# Patient Record
Sex: FEMALE | Race: ASIAN | NOT HISPANIC OR LATINO | Employment: UNEMPLOYED | URBAN - NONMETROPOLITAN AREA
[De-identification: names, ages, dates, MRNs, and addresses within clinical notes are randomized per-mention and may not be internally consistent; named-entity substitution may affect disease eponyms.]

---

## 2017-06-26 ENCOUNTER — OFFICE VISIT (OUTPATIENT)
Dept: INTERNAL MEDICINE | Facility: CLINIC | Age: 32
End: 2017-06-26

## 2017-06-26 VITALS
SYSTOLIC BLOOD PRESSURE: 104 MMHG | OXYGEN SATURATION: 99 % | DIASTOLIC BLOOD PRESSURE: 58 MMHG | TEMPERATURE: 97.9 F | HEART RATE: 74 BPM | WEIGHT: 115.38 LBS

## 2017-06-26 DIAGNOSIS — J30.1 SEASONAL ALLERGIC RHINITIS DUE TO POLLEN: Primary | ICD-10-CM

## 2017-06-26 DIAGNOSIS — Z87.892 HISTORY OF ANAPHYLAXIS: ICD-10-CM

## 2017-06-26 PROCEDURE — 99213 OFFICE O/P EST LOW 20 MIN: CPT | Performed by: INTERNAL MEDICINE

## 2017-06-26 RX ORDER — EPINEPHRINE 0.3 MG/.3ML
INJECTION SUBCUTANEOUS
COMMUNITY

## 2017-06-26 RX ORDER — LORATADINE 10 MG/1
10 TABLET ORAL DAILY
COMMUNITY
End: 2017-07-18 | Stop reason: SDUPTHER

## 2017-06-26 NOTE — PROGRESS NOTES
Chief Complaint   Patient presents with   • Establish Care     with Dr. Vermeesch today.      Subjective   Mary Kay Bullock is a 32 y.o. female.     HPI Comments: Patient is here to be established today.  Past medical history of allergies and anaphylactic shock.  Patient brings with her a note from her physician and dermatologist.  It states that patient has history of anaphylactic shock and has been hospitalized 3 times for this.  She carries with her and epinephrine autoinjector at all times and takes Claritin daily, sodium cromoglicate 1.5 mg three times daily.  She also was given procarterol inhaler to use prn when her allergies are flaring.  No past surgical history.  Medications include Claritin 10 mg daily and epinephrine when necessary  No known drug allergies.  Social history, family history reviewed.  HCM:  Mammogram completed in December 2016.   She has been feeling well.   Denies any current allergy sxs.  No wheezing or SOA.   She is currently pregnancy.  She is due in August.  Current meds are safe during pregnancy per her [place] doctor.   She has a good appetite.  She is sleeping well.          The following portions of the patient's history were reviewed and updated as appropriate: allergies, current medications, past family history, past medical history, past social history, past surgical history and problem list.    Review of Systems   HENT: Negative.    Respiratory: Negative for chest tightness, shortness of breath and wheezing.    Cardiovascular: Negative for palpitations and leg swelling.   Allergic/Immunologic: Positive for environmental allergies.       Objective   /58  Pulse 74  Temp 97.9 °F (36.6 °C)  Wt 115 lb 6 oz (52.3 kg)  SpO2 99%  There is no height or weight on file to calculate BMI.  Physical Exam   Constitutional: She is oriented to person, place, and time. She appears well-developed and well-nourished.   HENT:   Head: Normocephalic and atraumatic.   Right Ear: External ear  normal.   Left Ear: External ear normal.   Clear PND with mild erythema    Eyes: Conjunctivae and EOM are normal. Pupils are equal, round, and reactive to light.   Neck: Normal range of motion. Neck supple. No thyromegaly present.   Cardiovascular: Normal rate, regular rhythm, normal heart sounds and intact distal pulses.    No murmur heard.  Pulmonary/Chest: Effort normal and breath sounds normal. No respiratory distress.   Abdominal: Soft. Bowel sounds are normal.   Pt is 7 mo pregnant   Musculoskeletal: She exhibits no edema.   Lymphadenopathy:     She has no cervical adenopathy.   Neurological: She is alert and oriented to person, place, and time. No cranial nerve deficit.   Psychiatric: She has a normal mood and affect. Judgment normal.   Nursing note and vitals reviewed.      Assessment/Plan   Mary Kay Bullock is here today and the following problems have been addressed:      Mary Kay was seen today for establish care.    Diagnoses and all orders for this visit:    Seasonal allergic rhinitis due to pollen    History of anaphylaxis    Continue claritin daily and prn epi pen prn  Will try to find comparable medications to sodium cromoglicate and procarterol inhaler, then call these meds in for pt  Will contact her doctor in [place] to see if singulair and flovent inhaler are acceptable alternatives to above meds  She is currently pregnant and seeing OB/GYN  Her epi pen is due for RF next March    RTC 6 mo or prn      Please note that portions of this note were completed with a voice recognition program.  Efforts were made to edit dictation, but occasionally words are mistranscribed.

## 2017-07-05 ENCOUNTER — TELEPHONE (OUTPATIENT)
Dept: INTERNAL MEDICINE | Facility: CLINIC | Age: 32
End: 2017-07-05

## 2017-07-05 RX ORDER — ALBUTEROL SULFATE 90 UG/1
2 AEROSOL, METERED RESPIRATORY (INHALATION) EVERY 6 HOURS PRN
Qty: 1 INHALER | Refills: 1 | Status: SHIPPED | OUTPATIENT
Start: 2017-07-05 | End: 2021-03-04

## 2017-07-05 NOTE — TELEPHONE ENCOUNTER
I emailed Pt's previous doctor in regards to medications.     This is the response I received.       Hello?Dr. Vermeesch.  and  Ms, Justo Chau    Thank you for your email. I would not recommend the use of flovent  (Steroid?) for her stable condition. However, it is dependent on your decision and her status.  Best Regards.      Tianna Santos MD.

## 2017-07-05 NOTE — TELEPHONE ENCOUNTER
I recommend that patient take Claritin 10 mg daily.  I would like you to call her in an albuterol inhaler to replace her current procarterol inhaler.  I am not going to replace the other oral medication that she gets from [place], as we have nothing th  at is comparable to it.  Albuterol inhaler instructions are 2 puffs every 6 hours when necessary for wheezing as needed call in one inhaler with one refill.

## 2017-07-18 ENCOUNTER — TELEPHONE (OUTPATIENT)
Dept: INTERNAL MEDICINE | Facility: CLINIC | Age: 32
End: 2017-07-18

## 2017-07-18 RX ORDER — LORATADINE 10 MG/1
10 TABLET ORAL DAILY
Qty: 30 TABLET | Refills: 11 | Status: SHIPPED | OUTPATIENT
Start: 2017-07-18 | End: 2020-06-11 | Stop reason: SDUPTHER

## 2017-07-18 NOTE — TELEPHONE ENCOUNTER
----- Message from Marilyn K Vermeesch, MD sent at 7/18/2017 12:27 PM EDT -----  Regarding: RE: Referral Request  Contact: [phone]  Please call in Claritin 10 mg daily #30 with 11 refills.  Please let patient know if it was approved.    ----- Message -----     From: Yaquelin Chau MA     Sent: 7/18/2017   8:07 AM       To: Marilyn K Vermeesch, MD  Subject: FW: Referral Request                                 ----- Message -----     From: Mary Kay Bullock     Sent: 7/17/2017   9:06 PM       To: Fadumo Devi [facility]  Subject: Referral Request                                 Dr.Vermeesch    I asked you about my medication of Claritin, you told me to buy and get at over the counter.  However, if I buy it over the counter, my 's company will not bear the payment of the medicine. So I would like you to send the prescription to [facility] pharmacy. Could you do that?  I want you to let me know when I get the medicine.    Thank you,

## 2017-10-13 ENCOUNTER — FLU SHOT (OUTPATIENT)
Dept: INTERNAL MEDICINE | Facility: CLINIC | Age: 32
End: 2017-10-13

## 2017-10-13 PROCEDURE — 90686 IIV4 VACC NO PRSV 0.5 ML IM: CPT | Performed by: INTERNAL MEDICINE

## 2017-10-13 PROCEDURE — 90471 IMMUNIZATION ADMIN: CPT | Performed by: INTERNAL MEDICINE

## 2017-10-26 ENCOUNTER — OFFICE VISIT (OUTPATIENT)
Dept: INTERNAL MEDICINE | Facility: CLINIC | Age: 32
End: 2017-10-26

## 2017-10-26 VITALS
DIASTOLIC BLOOD PRESSURE: 62 MMHG | HEART RATE: 74 BPM | WEIGHT: 105 LBS | TEMPERATURE: 98.4 F | OXYGEN SATURATION: 96 % | SYSTOLIC BLOOD PRESSURE: 96 MMHG

## 2017-10-26 DIAGNOSIS — J06.9 VIRAL URI WITH COUGH: Primary | ICD-10-CM

## 2017-10-26 LAB
EXPIRATION DATE: NORMAL
INTERNAL CONTROL: NORMAL
Lab: NORMAL
S PYO AG THROAT QL: NEGATIVE

## 2017-10-26 PROCEDURE — 87880 STREP A ASSAY W/OPTIC: CPT | Performed by: PHYSICIAN ASSISTANT

## 2017-10-26 PROCEDURE — 99213 OFFICE O/P EST LOW 20 MIN: CPT | Performed by: PHYSICIAN ASSISTANT

## 2017-10-26 RX ORDER — ACETAMINOPHEN 500 MG
TABLET ORAL
Qty: 90 TABLET | Refills: 0 | Status: SHIPPED | OUTPATIENT
Start: 2017-10-26 | End: 2018-02-27 | Stop reason: SDUPTHER

## 2017-10-26 NOTE — PROGRESS NOTES
Mary Kay Bullock is a 32 y.o. female.     Subjective   History of Present Illness   Sore throat, cough, postnasal drip and rhinorrhea since yesterday. Denies fever, chills, headache, body aches or abdominal pain. Has not been using any OTC treatments for symtpoms. She is currently breastfeeding.       The following portions of the patient's history were reviewed and updated as appropriate: allergies, current medications, past family history, past medical history, past social history, past surgical history and problem list.    Review of Systems    Constitutional: Negative for appetite change, chills, fatigue, fever and unexpected weight change.   HENT: sore throat, congestion, rhinorrhea, postnasal drip. Negative for ear pain, hearing loss, nosebleeds, tinnitus and trouble swallowing.    Eyes: Negative for photophobia, discharge and visual disturbance.   Respiratory: cough. Negative for chest tightness, shortness of breath and wheezing.    Cardiovascular: Negative for chest pain, palpitations and leg swelling.   Gastrointestinal: Negative for abdominal distention, abdominal pain, blood in stool, constipation, diarrhea, nausea and vomiting.   Endocrine: Negative for cold intolerance, heat intolerance, polydipsia, polyphagia and polyuria.   Musculoskeletal: Negative for arthralgias, back pain, joint swelling, myalgias, neck pain and neck stiffness.   Skin: Negative for color change, pallor, rash and wound.   Allergic/Immunologic: Negative for environmental allergies, food allergies and immunocompromised state.   Neurological: Negative for dizziness, tremors, seizures, weakness, numbness and headaches.   Hematological: Negative for adenopathy. Does not bruise/bleed easily.   Psychiatric/Behavioral: Negative for sleep disturbances, agitation, behavioral problems, confusion, hallucinations, self-injury and suicidal ideas. The patient is not nervous/anxious.      Objective    Physical Exam  Constitutional: Oriented to person,  place, and time. Appears well-developed and well-nourished.   HENT: OP erythema without enlarged tonsils or exudate. Clear nasal congestion noted. TMs normal.   Head: No sinus tenderness. Normocephalic and atraumatic.   Eyes: EOM are normal. Pupils are equal, round, and reactive to light.   Neck: Normal range of motion. Neck supple.   Cardiovascular: Normal rate, regular rhythm and normal heart sounds.    Pulmonary/Chest: Effort normal and breath sounds normal. No respiratory distress.  Has no wheezes or rales. Exhibits no chest wall tenderness.   Abdominal: Soft. Bowel sounds are normal. Exhibits no distension and no mass. There is no tenderness.   Musculoskeletal: Normal range of motion. Exhibits no tenderness.   Neurological: Alert and oriented to person, place, and time.   Skin: Skin is warm and dry.   Psychiatric: Has a normal mood and affect. Behavior is normal. Judgment and thought content normal.       BP 96/62  Pulse 74  Temp 98.4 °F (36.9 °C)  Wt 105 lb (47.6 kg)  SpO2 96%    Nursing note and vitals reviewed.        Assessment/Plan   Mary Kay was seen today for cough, sore throat and sinus problem.    Diagnoses and all orders for this visit:    Viral URI with cough  -     POC Rapid Strep A- negative.     Discussed at home symptomatic care including and increase in warm fluid intake, Tylenol use and gargling with warm salt water.       Call or RTC if symptoms worsen or persist. Currently breastfeeding, take into consideration if antibiotics need to be prescribed at a later time.

## 2017-12-19 ENCOUNTER — OFFICE VISIT (OUTPATIENT)
Dept: INTERNAL MEDICINE | Facility: CLINIC | Age: 32
End: 2017-12-19

## 2017-12-19 VITALS
SYSTOLIC BLOOD PRESSURE: 100 MMHG | BODY MASS INDEX: 20.03 KG/M2 | TEMPERATURE: 97.8 F | DIASTOLIC BLOOD PRESSURE: 60 MMHG | HEIGHT: 60 IN | HEART RATE: 71 BPM | OXYGEN SATURATION: 97 % | WEIGHT: 102 LBS

## 2017-12-19 DIAGNOSIS — J30.1 CHRONIC SEASONAL ALLERGIC RHINITIS DUE TO POLLEN: ICD-10-CM

## 2017-12-19 DIAGNOSIS — J00 ACUTE NASOPHARYNGITIS: Primary | ICD-10-CM

## 2017-12-19 PROCEDURE — 99213 OFFICE O/P EST LOW 20 MIN: CPT | Performed by: INTERNAL MEDICINE

## 2018-02-27 ENCOUNTER — TELEPHONE (OUTPATIENT)
Dept: INTERNAL MEDICINE | Facility: CLINIC | Age: 33
End: 2018-02-27

## 2018-02-27 RX ORDER — ACETAMINOPHEN 500 MG
TABLET ORAL
Qty: 90 TABLET | Refills: 0 | Status: SHIPPED | OUTPATIENT
Start: 2018-02-27 | End: 2019-07-03 | Stop reason: SDUPTHER

## 2018-02-27 NOTE — TELEPHONE ENCOUNTER
----- Message from Mary Kay Bullock sent at 2/26/2018 10:53 PM EST -----  Regarding: Non-Urgent Medical Question  Contact: [phone]  Dr. Vermeesch,    This is a request about medication refill of acetaminophen 500 MG tablet.  Since I have a cold and sore throat, could you send its prescription to [facility] pharmacy and let me know when I get the medication.    Thank you,  Mary Kay Bullock

## 2018-03-14 ENCOUNTER — TELEPHONE (OUTPATIENT)
Dept: INTERNAL MEDICINE | Facility: CLINIC | Age: 33
End: 2018-03-14

## 2018-03-14 NOTE — TELEPHONE ENCOUNTER
----- Message from Marilyn K Vermeesch, MD sent at 3/14/2018 10:45 AM EDT -----  Regarding: FW: Non-Urgent Medical Question  Contact: [phone]  Please tell mother to stop amoxicillin.  Please call in a Zithromax and 100 mg per teaspoon, tell her to give 1/2 teaspoon daily for 3 days.    ----- Message -----  From: Yaquelin Chau MA  Sent: 3/14/2018   8:50 AM  To: Marilyn K Vermeesch, MD  Subject: FW: Non-Urgent Medical Question                      ----- Message -----  From: Mary Kay Bullock  Sent: 3/13/2018  11:28 PM  To: Fadumo Devi Mr Clinical Pool  Subject: RE: Non-Urgent Medical Question                  Dr. Vermeesch    After couple of days fever, it had already been down and this rash ( attached picture) is appeared on his body at the same time.  Is it ok that I keep to give him the medication that you prescribed ?    Mary Kay Bullock  ----- Message -----  From: OLGA KHANNA  Sent: 3/9/2018  1:05 PM EST  To: Mary Kay Bullock  Subject: RE: Non-Urgent Medical Question  If you feel like he needs to be seen you can come in at 4 pm today and see Dr. Vermeesch.      ----- Message -----     From: Mary Kay Bullock     Sent: 3/9/2018 10:27 AM EST       To: Marilyn K. Vermeesch, MD  Subject: Non-Urgent Medical Question    Dr. Vermeesh,    [place] has been a fever 101.1F since last night so I gave him Tylenol.  Although the fever is still continued, he is fine so far.  What can I do for him anything else? Could you give me advises?    Thank you,  Mary Kay Bullock

## 2018-09-24 ENCOUNTER — PATIENT MESSAGE (OUTPATIENT)
Dept: INTERNAL MEDICINE | Facility: CLINIC | Age: 33
End: 2018-09-24

## 2018-09-27 ENCOUNTER — FLU SHOT (OUTPATIENT)
Dept: INTERNAL MEDICINE | Facility: CLINIC | Age: 33
End: 2018-09-27

## 2018-09-27 DIAGNOSIS — Z23 FLU VACCINE NEED: ICD-10-CM

## 2018-09-27 PROCEDURE — 90471 IMMUNIZATION ADMIN: CPT | Performed by: INTERNAL MEDICINE

## 2018-09-27 PROCEDURE — 90674 CCIIV4 VAC NO PRSV 0.5 ML IM: CPT | Performed by: INTERNAL MEDICINE

## 2019-07-03 ENCOUNTER — OFFICE VISIT (OUTPATIENT)
Dept: INTERNAL MEDICINE | Facility: CLINIC | Age: 34
End: 2019-07-03

## 2019-07-03 VITALS
TEMPERATURE: 97.1 F | OXYGEN SATURATION: 99 % | SYSTOLIC BLOOD PRESSURE: 105 MMHG | WEIGHT: 102 LBS | BODY MASS INDEX: 20.03 KG/M2 | HEART RATE: 75 BPM | DIASTOLIC BLOOD PRESSURE: 66 MMHG | HEIGHT: 60 IN | RESPIRATION RATE: 18 BRPM

## 2019-07-03 DIAGNOSIS — J02.9 SORE THROAT: Primary | ICD-10-CM

## 2019-07-03 LAB
EXPIRATION DATE: NORMAL
INTERNAL CONTROL: NORMAL
Lab: NORMAL
S PYO AG THROAT QL: NEGATIVE

## 2019-07-03 PROCEDURE — 87880 STREP A ASSAY W/OPTIC: CPT | Performed by: NURSE PRACTITIONER

## 2019-07-03 PROCEDURE — 99213 OFFICE O/P EST LOW 20 MIN: CPT | Performed by: NURSE PRACTITIONER

## 2019-07-03 RX ORDER — ACETAMINOPHEN 500 MG
TABLET ORAL
Qty: 90 TABLET | Refills: 0 | Status: SHIPPED | OUTPATIENT
Start: 2019-07-03 | End: 2020-07-06

## 2019-07-03 NOTE — PROGRESS NOTES
"Date: 2019    Name: Mary Kay Bullock  : 1985    Chief Complaint:   Chief Complaint   Patient presents with   • Sore Throat     Pt has irritation on her throat, aswell as a sore throat.        HPI: Mrs Bullock's children have both been diagnosed with strep since yesterday.  She has been more fatigued than normal, she is pregnant & not sure of cause of fatigue.  She has had a scratchy throat x 4 days.  Denies headache, rash, n/v/d at this time.  Has not taken any medication in the past week.      History:  The following portions of the patient's history were reviewed and updated as appropriate: allergies, current medications, past medical history, family history, surgical history, social history and problem list.     ROS:  Review of Systems   Constitutional: Positive for fatigue. Negative for appetite change, chills and fever.   HENT: Negative for congestion, ear pain, sinus pressure, sneezing and trouble swallowing.    Respiratory: Negative for cough.    Cardiovascular: Negative for palpitations.       VS:  Vitals:    19 1521   BP: 105/66   Pulse: 75   Resp: 18   Temp: 97.1 °F (36.2 °C)   TempSrc: Temporal   SpO2: 99%   Weight: 46.3 kg (102 lb)   Height: 152 cm (59.84\")     Body mass index is 20.03 kg/m².    PE:  Physical Exam   Constitutional: She is oriented to person, place, and time. She appears well-developed and well-nourished.   HENT:   Head: Normocephalic.   Right Ear: Tympanic membrane, external ear and ear canal normal.   Left Ear: Tympanic membrane, external ear and ear canal normal.   Nose: Nose normal.   Mouth/Throat: Uvula is midline, oropharynx is clear and moist and mucous membranes are normal. Tonsils are 0 on the right. Tonsils are 0 on the left. No tonsillar exudate.   Eyes: Conjunctivae are normal.   Cardiovascular: Normal rate, regular rhythm and normal heart sounds.   Pulmonary/Chest: Effort normal and breath sounds normal.   Lymphadenopathy:     She has no cervical adenopathy. "   Neurological: She is alert and oriented to person, place, and time.      Results review:  Office Visit on 07/03/2019   Component Date Value Ref Range Status   • Rapid Strep A Screen 07/03/2019 Negative  Negative, VALID, INVALID, Not Performed Final   • Internal Control 07/03/2019 Passed  Passed Final   • Lot Number 07/03/2019 [id number]   Final   • Expiration Date 07/03/2019 03-31-20   Final   ]  Assessment/Plan:  Mary Kay was seen today for sore throat.    Diagnoses and all orders for this visit:    Sore throat  -     POCT rapid strep A  -     acetaminophen (TYLENOL) 500 MG tablet; Take 1-2 tablets by mouth every 6 hours as needed for fever, mild pain or headache.  - Encouraged to return to clinic if sore throat worsens, headache/fever/rash develop  - Drink plenty of clear, decaffeinated fluids, as tolerated.  - Acetaminophen, per package directions, as needed for sore throat, mild pain    Return if symptoms worsen or fail to improve.

## 2019-08-26 RX ORDER — TRIAMCINOLONE ACETONIDE 1 MG/G
CREAM TOPICAL
Qty: 45 G | Refills: 1 | Status: SHIPPED | OUTPATIENT
Start: 2019-08-26

## 2019-09-13 ENCOUNTER — FLU SHOT (OUTPATIENT)
Dept: INTERNAL MEDICINE | Facility: CLINIC | Age: 34
End: 2019-09-13

## 2019-09-13 DIAGNOSIS — Z23 FLU VACCINE NEED: ICD-10-CM

## 2019-09-13 PROCEDURE — 90674 CCIIV4 VAC NO PRSV 0.5 ML IM: CPT | Performed by: INTERNAL MEDICINE

## 2019-09-13 PROCEDURE — 90471 IMMUNIZATION ADMIN: CPT | Performed by: INTERNAL MEDICINE

## 2020-06-11 RX ORDER — LORATADINE 10 MG/1
10 TABLET ORAL DAILY
Qty: 30 TABLET | Refills: 11 | Status: SHIPPED | OUTPATIENT
Start: 2020-06-11 | End: 2021-11-09 | Stop reason: SDUPTHER

## 2020-07-05 DIAGNOSIS — J02.9 SORE THROAT: ICD-10-CM

## 2020-07-06 RX ORDER — ACETAMINOPHEN 500 MG
TABLET ORAL
Qty: 90 TABLET | Refills: 0 | Status: SHIPPED | OUTPATIENT
Start: 2020-07-06

## 2020-09-25 ENCOUNTER — FLU SHOT (OUTPATIENT)
Dept: INTERNAL MEDICINE | Facility: CLINIC | Age: 35
End: 2020-09-25

## 2020-09-25 DIAGNOSIS — Z23 NEED FOR INFLUENZA VACCINATION: ICD-10-CM

## 2020-09-25 PROCEDURE — 90686 IIV4 VACC NO PRSV 0.5 ML IM: CPT | Performed by: INTERNAL MEDICINE

## 2020-09-25 PROCEDURE — 90471 IMMUNIZATION ADMIN: CPT | Performed by: INTERNAL MEDICINE

## 2020-11-10 ENCOUNTER — TELEPHONE (OUTPATIENT)
Dept: INTERNAL MEDICINE | Facility: CLINIC | Age: 35
End: 2020-11-10

## 2020-11-10 NOTE — TELEPHONE ENCOUNTER
----- Message from Mary Kay Bullock sent at 11/10/2020  1:13 PM EST -----  Regarding: Visit Follow-Up Question  Contact: [phone]  May Tamiko martin get the shots of MMRV and Hib 4th this afternoon around 3:30?

## 2021-03-04 ENCOUNTER — OFFICE VISIT (OUTPATIENT)
Dept: ORTHOPEDIC SURGERY | Facility: CLINIC | Age: 36
End: 2021-03-04

## 2021-03-04 VITALS
SYSTOLIC BLOOD PRESSURE: 114 MMHG | BODY MASS INDEX: 20.03 KG/M2 | DIASTOLIC BLOOD PRESSURE: 68 MMHG | WEIGHT: 102 LBS | HEIGHT: 60 IN | RESPIRATION RATE: 19 BRPM

## 2021-03-04 DIAGNOSIS — S63.641A SPRAIN OF METACARPOPHALANGEAL (MCP) JOINT OF RIGHT THUMB, INITIAL ENCOUNTER: ICD-10-CM

## 2021-03-04 DIAGNOSIS — M79.644 FINGER PAIN, RIGHT: Primary | ICD-10-CM

## 2021-03-04 PROCEDURE — 99203 OFFICE O/P NEW LOW 30 MIN: CPT | Performed by: ORTHOPAEDIC SURGERY

## 2021-03-04 NOTE — PROGRESS NOTES
Subjective   Patient ID: Mary Kay Bullock is a 36 y.o. 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  She also describes what she says is feels like a nausea feeling in her right upper arm which is more of a radicular distribution but really does not radiate as far as the thumb itself.  Denies neck injury during her sweating or any stiffness of the neck or soreness in the shoulder area.      Review of Systems   Constitutional: Negative for diaphoresis, fever and unexpected weight change.   HENT: Negative for dental problem and sore throat.    Eyes: Negative for visual disturbance.   Respiratory: Negative for shortness of breath.    Cardiovascular: Negative for chest pain.   Gastrointestinal: Negative for abdominal pain, constipation, diarrhea, nausea and vomiting.   Genitourinary: Negative for difficulty urinating and frequency.   Musculoskeletal: Positive for arthralgias (right thumb).   Neurological: Negative for headaches.   Hematological: Does not bruise/bleed easily.       History reviewed. No pertinent past medical history.     Past Surgical History:   Procedure Laterality Date   •  SECTION         Family History   Problem Relation Age of Onset   • Hypertension Father    • Diabetes Paternal Grandfather        Social History     Socioeconomic History   • Marital status:      Spouse name: Not on file   • Number of children: Not on file   • Years of education: Not on file   • Highest education level: Not on file   Occupational History     Employer:  "UNEMPLOYED   Tobacco Use   • Smoking status: Never Smoker   • Smokeless tobacco: Never Used   Substance and Sexual Activity   • Alcohol use: No   • Drug use: No   • Sexual activity: Defer   Social History Narrative    Right hand dominant       I have reviewed the medical and surgical history, family history, social history, medications, and/or allergies, and the review of systems of this report.    Allergies   Allergen Reactions   • Penicillins Hives         Current Outpatient Medications:   •  acetaminophen (TYLENOL) 500 MG tablet, TAKE ONE TO TWO TABLETS BY MOUTH EVERY 6 HOURS AS NEEDED FOR FEVER, MILD PAIN, OR HEADACHE, Disp: 90 tablet, Rfl: 0  •  EPINEPHrine (EPIPEN) 0.3 MG/0.3ML solution auto-injector injection, , Disp: , Rfl:   •  loratadine (CLARITIN) 10 MG tablet, Take 1 tablet by mouth Daily., Disp: 30 tablet, Rfl: 11  •  triamcinolone (KENALOG) 0.1 % cream, use 2-3 times daily on rash, Disp: 45 g, Rfl: 1    Objective   /68 (BP Location: Right arm, Patient Position: Sitting, Cuff Size: Adult)   Resp 19   Ht 152.4 cm (60\")   Wt 46.3 kg (102 lb)   BMI 19.92 kg/m²    Physical Exam  Constitutional: Patient is oriented to person, place, and time. Patient appears well-developed and well-nourished.   HENT:Head: Normocephalic and atraumatic.   Eyes: EOM are normal. Pupils are equal, round, and reactive to light.   Neck: Normal range of motion. Neck supple.   Cardiovascular: Normal rate.    Pulmonary/Chest: Effort normal and breath sounds normal.   Abdominal: Soft.   Neurological: Patient is alert and oriented to person, place, and time.   Skin: Skin is warm and dry.   Psychiatric: Patient has a normal mood and affect.   Nursing note and vitals reviewed.       [unfilled]   Right thumb: Some tenderness at the MP joint but no acute instability with ulnar collateral stress, full active extension EPL FPL tendon function intact no triggering of the FPL tendon sheath: No pain with basal joint grind maneuver " full painless wrist range of motion Finkelstein maneuver negative no tenderness over the de Quervain's region.    Assessment/Plan   Review of Radiographic Studies:    Radiographic images today of affected area I personally viewed and showed no sign of acute fracture or dislocation.      Procedures     Diagnoses and all orders for this visit:    1. Finger pain, right (Primary)  -     XR Finger 2+ View Right; Future    2. Sprain of metacarpophalangeal (MCP) joint of right thumb, initial encounter       Orthopedic activities reviewed and patient expressed appreciation, Using illustrations and models, the nature of the pathology was explained to the patient and Use brace as instructed      Recommendations/Plan:   Work/Activity Status: May perform usual activities as tolerated    Patient agreeable to call or return sooner for any concerns.             Impression:  Right dominant thumb MP joint strain  Plan:  Night splinting mobilization as tolerated return to see me as needed--if her right upper arm pain continues I told her to contact her PCP for initial treatment and then referral for orthopedic evaluation if necessary

## 2021-04-07 ENCOUNTER — IMMUNIZATION (OUTPATIENT)
Dept: VACCINE CLINIC | Facility: HOSPITAL | Age: 36
End: 2021-04-07

## 2021-04-07 PROCEDURE — 0001A: CPT | Performed by: INTERNAL MEDICINE

## 2021-04-07 PROCEDURE — 91300 HC SARSCOV02 VAC 30MCG/0.3ML IM: CPT | Performed by: INTERNAL MEDICINE

## 2021-04-28 ENCOUNTER — IMMUNIZATION (OUTPATIENT)
Dept: VACCINE CLINIC | Facility: HOSPITAL | Age: 36
End: 2021-04-28

## 2021-04-28 PROCEDURE — 0002A: CPT | Performed by: INTERNAL MEDICINE

## 2021-04-28 PROCEDURE — 91300 HC SARSCOV02 VAC 30MCG/0.3ML IM: CPT | Performed by: INTERNAL MEDICINE

## 2021-10-01 ENCOUNTER — FLU SHOT (OUTPATIENT)
Dept: INTERNAL MEDICINE | Facility: CLINIC | Age: 36
End: 2021-10-01

## 2021-10-01 DIAGNOSIS — Z23 NEED FOR INFLUENZA VACCINATION: Primary | ICD-10-CM

## 2021-10-01 PROCEDURE — 90471 IMMUNIZATION ADMIN: CPT | Performed by: INTERNAL MEDICINE

## 2021-10-01 PROCEDURE — 90686 IIV4 VACC NO PRSV 0.5 ML IM: CPT | Performed by: INTERNAL MEDICINE

## 2021-11-09 RX ORDER — LORATADINE 10 MG/1
10 TABLET ORAL DAILY
Qty: 30 TABLET | Refills: 11 | Status: SHIPPED | OUTPATIENT
Start: 2021-11-09 | End: 2023-01-03

## 2021-11-15 ENCOUNTER — PATIENT MESSAGE (OUTPATIENT)
Dept: INTERNAL MEDICINE | Facility: CLINIC | Age: 36
End: 2021-11-15

## 2021-11-15 RX ORDER — VALACYCLOVIR HYDROCHLORIDE 500 MG/1
500 TABLET, FILM COATED ORAL 2 TIMES DAILY
Qty: 30 TABLET | Refills: 1 | Status: SHIPPED | OUTPATIENT
Start: 2021-11-15 | End: 2023-02-01

## 2021-11-15 NOTE — TELEPHONE ENCOUNTER
From: Mary Kay Bullock  To: Marilyn K. Vermeesch, MD  Sent: 11/15/2021 7:53 AM EST  Subject: Valaciclovir 500    I got herpes on my lip. I had the medicine brought from [place], which I used them all.  Could you give me the prescription for Valaciclovir 500.

## 2022-06-23 ENCOUNTER — OFFICE VISIT (OUTPATIENT)
Dept: INTERNAL MEDICINE | Facility: CLINIC | Age: 37
End: 2022-06-23

## 2022-06-23 VITALS
BODY MASS INDEX: 19.63 KG/M2 | TEMPERATURE: 97 F | HEART RATE: 67 BPM | DIASTOLIC BLOOD PRESSURE: 60 MMHG | WEIGHT: 100 LBS | HEIGHT: 60 IN | SYSTOLIC BLOOD PRESSURE: 114 MMHG | OXYGEN SATURATION: 98 %

## 2022-06-23 DIAGNOSIS — J02.9 SORE THROAT: ICD-10-CM

## 2022-06-23 DIAGNOSIS — J30.1 SEASONAL ALLERGIC RHINITIS DUE TO POLLEN: ICD-10-CM

## 2022-06-23 PROBLEM — J00 ACUTE NASOPHARYNGITIS: Status: RESOLVED | Noted: 2017-12-19 | Resolved: 2022-06-23

## 2022-06-23 LAB
EXPIRATION DATE: NORMAL
INTERNAL CONTROL: NORMAL
Lab: NORMAL
S PYO AG THROAT QL: NEGATIVE

## 2022-06-23 PROCEDURE — 99213 OFFICE O/P EST LOW 20 MIN: CPT | Performed by: INTERNAL MEDICINE

## 2022-06-23 PROCEDURE — 87880 STREP A ASSAY W/OPTIC: CPT | Performed by: INTERNAL MEDICINE

## 2022-06-23 RX ORDER — TRIAMCINOLONE ACETONIDE 55 UG/1
2 SPRAY, METERED NASAL DAILY
Qty: 16.5 G | Refills: 3 | Status: SHIPPED | OUTPATIENT
Start: 2022-06-23 | End: 2023-06-23

## 2022-06-23 NOTE — PROGRESS NOTES
"Chief Complaint   Patient presents with   • Abstract     Sore throat X yesterday.     Subjective   Mary Kay Bullock is a 37 y.o. female.     Sore Throat   This is a recurrent problem. The current episode started yesterday. The problem has been gradually improving. The pain is worse on the right side. There has been no fever. The pain is at a severity of 7/10. Associated symptoms include congestion and diarrhea. Pertinent negatives include no coughing, ear pain or trouble swallowing. She has had no exposure to strep or mono. Treatments tried: claritin. The treatment provided mild relief.        The following portions of the patient's history were reviewed and updated as appropriate: allergies, current medications, past family history, past medical history, past social history, past surgical history and problem list.    Review of Systems   Constitutional: Negative for chills and fever.   HENT: Positive for congestion and sore throat. Negative for ear pain and trouble swallowing.    Respiratory: Negative for cough.    Gastrointestinal: Positive for diarrhea.       Objective   /60   Pulse 67   Temp 97 °F (36.1 °C)   Ht 152 cm (59.84\")   Wt 45.4 kg (100 lb)   SpO2 98%   BMI 19.63 kg/m²   Body mass index is 19.63 kg/m².  Physical Exam  Vitals and nursing note reviewed.   Constitutional:       General: She is not in acute distress.     Appearance: Normal appearance. She is not ill-appearing.      Comments: Pleasant female in no distress today   HENT:      Head: Normocephalic and atraumatic.      Right Ear: Tympanic membrane, ear canal and external ear normal.      Left Ear: Tympanic membrane, ear canal and external ear normal.      Nose: Rhinorrhea present.      Comments: Turbinates are pale and boggy with clear rhinorrhea     Mouth/Throat:      Pharynx: Posterior oropharyngeal erythema present.      Comments: Cobblestoning of posterior pharynx with significant amount of clear postnasal drip  Eyes:      General:    "      Right eye: No discharge.         Left eye: No discharge.      Extraocular Movements: Extraocular movements intact.   Cardiovascular:      Rate and Rhythm: Normal rate and regular rhythm.      Heart sounds: Normal heart sounds. No murmur heard.  Pulmonary:      Effort: Pulmonary effort is normal. No respiratory distress.      Breath sounds: Normal breath sounds.   Musculoskeletal:      Cervical back: No tenderness.   Lymphadenopathy:      Cervical: No cervical adenopathy.   Neurological:      General: No focal deficit present.      Mental Status: She is alert and oriented to person, place, and time.      Cranial Nerves: No cranial nerve deficit.   Psychiatric:         Mood and Affect: Mood normal.         Behavior: Behavior normal.         Thought Content: Thought content normal.         Judgment: Judgment normal.         Assessment & Plan   Mary Kay Bullock is here today and the following problems have been addressed:      Diagnoses and all orders for this visit:    1. Seasonal allergic rhinitis due to pollen    2. Sore throat  -     POCT rapid strep A    Other orders  -     Triamcinolone Acetonide (NASACORT) 55 MCG/ACT nasal inhaler; 2 sprays into the nostril(s) as directed by provider Daily.  Dispense: 16.5 g; Refill: 3    Exam consistent with seasonal allergies  Encourage daily use of Claritin  Recommend addition of Nasacort 2 sprays each nostril every night  Patient sees her GYN annually for physical exam  She comes here primarily for sick visits only    Return to clinic as needed    Please note that portions of this note were completed with a voice recognition program.  Efforts were made to edit dictation, but occasionally words are mistranscribed.  Answers for HPI/ROS submitted by the patient on 6/23/2022  What is the primary reason for your visit?: Sore Throat

## 2022-07-05 ENCOUNTER — PATIENT MESSAGE (OUTPATIENT)
Dept: INTERNAL MEDICINE | Facility: CLINIC | Age: 37
End: 2022-07-05

## 2023-01-03 RX ORDER — LORATADINE 10 MG/1
TABLET ORAL
Qty: 90 TABLET | Refills: 1 | Status: SHIPPED | OUTPATIENT
Start: 2023-01-03

## 2023-02-01 RX ORDER — VALACYCLOVIR HYDROCHLORIDE 500 MG/1
TABLET, FILM COATED ORAL
Qty: 30 TABLET | Refills: 1 | Status: SHIPPED | OUTPATIENT
Start: 2023-02-01

## 2023-02-06 RX ORDER — AZITHROMYCIN 250 MG/1
TABLET, FILM COATED ORAL
Qty: 6 TABLET | Refills: 0 | Status: SHIPPED | OUTPATIENT
Start: 2023-02-06